# Patient Record
Sex: FEMALE | Race: ASIAN | NOT HISPANIC OR LATINO | Employment: OTHER | URBAN - METROPOLITAN AREA
[De-identification: names, ages, dates, MRNs, and addresses within clinical notes are randomized per-mention and may not be internally consistent; named-entity substitution may affect disease eponyms.]

---

## 2017-08-02 ENCOUNTER — HOSPITAL ENCOUNTER (OUTPATIENT)
Dept: MAMMOGRAPHY | Facility: CLINIC | Age: 62
Discharge: HOME | End: 2017-08-02
Attending: PHYSICIAN ASSISTANT | Admitting: PHYSICIAN ASSISTANT
Payer: COMMERCIAL

## 2017-08-02 DIAGNOSIS — Z12.31 ENCOUNTER FOR SCREENING MAMMOGRAM FOR HIGH-RISK PATIENT: ICD-10-CM

## 2017-08-02 PROCEDURE — G0202 SCR MAMMO BI INCL CAD: HCPCS

## 2018-08-29 ENCOUNTER — TRANSFERRED RECORDS (OUTPATIENT)
Dept: HEALTH INFORMATION MANAGEMENT | Facility: CLINIC | Age: 63
End: 2018-08-29

## 2018-08-29 LAB
ALT SERPL-CCNC: 14 IU/L (ref 0–32)
AST SERPL-CCNC: 17 IU/L (ref 0–40)
CHOLEST SERPL-MCNC: 179 MG/DL (ref 100–199)
CREAT SERPL-MCNC: 0.67 MG/DL (ref 0.57–1)
GFR SERPL CREATININE-BSD FRML MDRD: 95 ML/MIN/1.73M2
GLUCOSE SERPL-MCNC: 125 MG/DL (ref 65–99)
HDLC SERPL-MCNC: 48 MG/DL
LDLC SERPL CALC-MCNC: 115 MG/DL (ref 0–99)
POTASSIUM SERPL-SCNC: 4 MMOL/L (ref 3.5–5.2)
TRIGL SERPL-MCNC: 81 MG/DL (ref 0–149)
TSH SERPL-ACNC: 2.26 UIU/ML (ref 0.45–4.5)

## 2018-09-06 ENCOUNTER — HOSPITAL ENCOUNTER (OUTPATIENT)
Dept: MAMMOGRAPHY | Facility: CLINIC | Age: 63
End: 2018-09-06
Attending: PHYSICIAN ASSISTANT
Payer: COMMERCIAL

## 2018-09-06 DIAGNOSIS — Z12.31 VISIT FOR SCREENING MAMMOGRAM: ICD-10-CM

## 2018-09-06 PROCEDURE — 77063 BREAST TOMOSYNTHESIS BI: CPT

## 2018-12-12 ENCOUNTER — TRANSFERRED RECORDS (OUTPATIENT)
Dept: HEALTH INFORMATION MANAGEMENT | Facility: CLINIC | Age: 63
End: 2018-12-12

## 2019-02-19 ENCOUNTER — OFFICE VISIT (OUTPATIENT)
Dept: ENDOCRINOLOGY | Facility: CLINIC | Age: 64
End: 2019-02-19
Payer: COMMERCIAL

## 2019-02-19 VITALS
BODY MASS INDEX: 22.05 KG/M2 | SYSTOLIC BLOOD PRESSURE: 144 MMHG | HEIGHT: 60 IN | WEIGHT: 112.3 LBS | DIASTOLIC BLOOD PRESSURE: 70 MMHG | HEART RATE: 79 BPM

## 2019-02-19 DIAGNOSIS — E78.5 HYPERLIPIDEMIA LDL GOAL <100: ICD-10-CM

## 2019-02-19 DIAGNOSIS — E11.9 TYPE 2 DIABETES MELLITUS TREATED WITHOUT INSULIN (H): Primary | ICD-10-CM

## 2019-02-19 DIAGNOSIS — E78.5 HYPERLIPIDEMIA LDL GOAL <100: Primary | ICD-10-CM

## 2019-02-19 LAB — HBA1C MFR BLD: 6.5 % (ref 0–5.6)

## 2019-02-19 PROCEDURE — 83036 HEMOGLOBIN GLYCOSYLATED A1C: CPT | Performed by: INTERNAL MEDICINE

## 2019-02-19 PROCEDURE — 80048 BASIC METABOLIC PNL TOTAL CA: CPT | Performed by: INTERNAL MEDICINE

## 2019-02-19 PROCEDURE — 84443 ASSAY THYROID STIM HORMONE: CPT | Performed by: INTERNAL MEDICINE

## 2019-02-19 PROCEDURE — 36415 COLL VENOUS BLD VENIPUNCTURE: CPT | Performed by: INTERNAL MEDICINE

## 2019-02-19 PROCEDURE — 82043 UR ALBUMIN QUANTITATIVE: CPT | Performed by: INTERNAL MEDICINE

## 2019-02-19 PROCEDURE — 99214 OFFICE O/P EST MOD 30 MIN: CPT | Performed by: INTERNAL MEDICINE

## 2019-02-19 PROCEDURE — 84460 ALANINE AMINO (ALT) (SGPT): CPT | Performed by: INTERNAL MEDICINE

## 2019-02-19 RX ORDER — GLIMEPIRIDE 2 MG/1
TABLET ORAL
Refills: 0 | COMMUNITY
Start: 2019-02-16 | End: 2019-10-10

## 2019-02-19 RX ORDER — METFORMIN HCL 500 MG
TABLET, EXTENDED RELEASE 24 HR ORAL
Refills: 3 | COMMUNITY
Start: 2019-02-16 | End: 2019-10-10

## 2019-02-19 RX ORDER — PRAVASTATIN SODIUM 20 MG
20 TABLET ORAL AT BEDTIME
Qty: 90 TABLET | Refills: 3 | Status: SHIPPED | OUTPATIENT
Start: 2019-02-19 | End: 2019-12-09

## 2019-02-19 RX ORDER — AMLODIPINE BESYLATE 2.5 MG/1
TABLET ORAL
Refills: 3 | COMMUNITY
Start: 2018-12-03 | End: 2019-12-10

## 2019-02-19 RX ORDER — LOSARTAN POTASSIUM AND HYDROCHLOROTHIAZIDE 12.5; 1 MG/1; MG/1
1 TABLET ORAL DAILY
Refills: 3 | COMMUNITY
Start: 2019-02-16

## 2019-02-19 ASSESSMENT — MIFFLIN-ST. JEOR: SCORE: 983.4

## 2019-02-19 NOTE — PROGRESS NOTES
Name: Aleah Rockwell is a 64 year old woman, self-referred for evaluation of diabetes mellitus.  Previously seen by me at my former clinic ([facility]), here to establish care with [facility].    Chief Complaint   Patient presents with     Diabetes       HPI:  Recent issues:  Here for evaluation of diabetes.  Feeling well overall, no new health issues reported  Reviewed medical history from patient and Epic chart record        ~2005. Diagnosis of diabetes mellitus  ...Had seen Laura Dsouza PAC/[facility] clinic  Routine lab tests showed elevated glucose levels  Has taken metformin and glimeparide medications    Current DM medications:   metforminXR 500 mg 2-tabs QAM   Glimeparide 2 mg 1-tab BID  Using BG meter (name?)   Tests QAM   Recent FBG  mg/dl    Summer 2018. Had seen Dr. Sabiha Jacinto/[facility]  Recalls hgbA1c 7-7.5% level    Recent [facility] labs include:  Lab Results   Component Value Date     09/27/2015    POTASSIUM 4.0 08/29/2018    CHLORIDE 104 09/27/2015    CO2 28 09/27/2015    ANIONGAP 6 09/27/2015     (H) 08/29/2018    BUN 14 09/27/2015    CR 0.67 08/29/2018    GFRESTIMATED 95 08/29/2018    GFRESTBLACK 109 08/29/2018    JUNE 9.1 09/27/2015    CHOL 179 08/29/2018    TRIG 81 08/29/2018    HDL 48 08/29/2018     (H) 08/29/2018    TSH 2.260 08/29/2018     Hyperlipidemia:  Has taken Provastatin 20 mg daily... Ran out Fall 2018  Last eye exam 1/2019, no DR noted  DM Complications:  None   ...        , works as   Sees Stephanie Gallardo PAC/[facility] clinic for general medicine evaluations.  Also sees Dr. CHANDANA Zapata/[facility]    PMH/PSH:  Past Medical History:   Diagnosis Date     Anemia      Hypertension      Type 2 diabetes mellitus (H)      Past Surgical History:   Procedure Laterality Date     BONE MARROW BIOPSY, BONE SPECIMEN, NEEDLE/TROCAR  2/18/2013    Procedure: BIOPSY BONE MARROW;  BONE MARROW BIOPSY  (CONS.SED);  Surgeon:  Ethan Holt MD;  Location:  GI     C/SECTION, CLASSICAL  1982 and 1985     HYSTERECTOMY  2004    with kian S&O     ORTHOPEDIC SURGERY      ankle cyst removed       Family Hx:  No family history on file.      Social Hx:  Social History     Socioeconomic History     Marital status:      Spouse name: Not on file     Number of children: Not on file     Years of education: Not on file     Highest education level: Not on file   Social Needs     Financial resource strain: Not on file     Food insecurity - worry: Not on file     Food insecurity - inability: Not on file     Transportation needs - medical: Not on file     Transportation needs - non-medical: Not on file   Occupational History     Not on file   Tobacco Use     Smoking status: Never Smoker     Smokeless tobacco: Never Used   Substance and Sexual Activity     Alcohol use: No     Drug use: No     Sexual activity: Not on file   Other Topics Concern     Parent/sibling w/ CABG, MI or angioplasty before 65F 55M? Not Asked   Social History Narrative     Not on file          MEDICATIONS:  has a current medication list which includes the following prescription(s): amlodipine, glimepiride, losartan-hydrochlorothiazide, and metformin.    ROS:     ROS: 10 point ROS neg other than the symptoms noted above in the HPI.    GENERAL: mild fatigue, wt stable; denies fevers, chills, night sweats.   HEENT: no dysphagia, odonophagia, diplopia, neck pain  THYROID:  no apparent hyper or hypothyroid symptoms  CV: no chest pain, pressure, palpitations  LUNGS: no SOB, PARADA, cough, wheezing   ABDOMEN: no diarrhea, constipation, abdominal pain  EXTREMITIES: no rashes, ulcers, edema  NEUROLOGY: no headaches, denies changes in vision, tingling, extremitiy numbness   MSK: no muscle aches or pains, weakness  SKIN: no rashes or lesions  : no menses, denies hot flashes  PSYCH:  stable mood, no significant anxiety or depression  ENDOCRINE: no heat or cold intolerance    Physical  "Exam   VS: /70   Pulse 79   Ht 1.52 m (4' 11.84\")   Wt 50.9 kg (112 lb 4.8 oz)   BMI 22.05 kg/m    GENERAL: AXOX3, NAD, well dressed, petite size, answering questions appropriately, appears stated age.  THYROID:  normal gland, no apparent nodules or goiter  HEENT: neck non-tender, no exopthalmous, no proptosis, EOMI  CV: RRR, no rubs, gallops, no murmurs  LUNGS: CTAB, no wheezes, rales, or ronchi  ABDOMEN: soft, nontender, nondistended  EXTREMITIES: no edema, no lesions  NEUROLOGY: CN grossly intact, no tremors  MSK: grossly intact  SKIN: no rashes, no lesions    LABS:    All pertinent notes, labs, and images personally reviewed by me.     A/P:  Encounter Diagnoses   Name Primary?     Type 2 diabetes mellitus treated without insulin (H) Yes     Hyperlipidemia LDL goal <100      Comments:  Reviewed health history and diabetes issues.    Plan:  Discussed general issues with the diabetes diagnosis and management  We discussed the hgbA1c test which reflects previous overall glucose levels or control  Discussed the importance of blood glucose (BG) testing to assess glucose trends  Provided general overview of the diabetes medication options and medication treatment plan.    Recommend:  Continue current metformin and glimeparide doses at this time   Consider dose reduction of glimeparide if low glucose level trends, such as 2 mg QAM and 1 mg QPM  Goal target FBG  mg/dl  Monitor for symptom changes  Restart Provastatin 20 mg at bedtime, new Rx sent to pharmacy  Keep focus on diet, exercise, weight management.  Advise having fasting lipid panel testing and dilated eye examination, at least annually    Addressed patient questions today    Labs ordered today:   Orders Placed This Encounter   Procedures     Hemoglobin A1c     ALT     Basic metabolic panel     TSH with free T4 reflex     Albumin Random Urine Quantitative with Creat Ratio     Radiology/Consults ordered today: None    More than 50% of the time " spent with Ms. Rockwell on counseling / coordinating her care.  Total appointment time was 30 minutes.    Follow-up:  6 mo or prn    Arnel eL MD  Endocrinology  [facility]  CC: Stephanie Gallardo

## 2019-02-20 LAB
ALT SERPL W P-5'-P-CCNC: 19 U/L (ref 0–50)
ANION GAP SERPL CALCULATED.3IONS-SCNC: 4 MMOL/L (ref 3–14)
BUN SERPL-MCNC: 21 MG/DL (ref 7–30)
CALCIUM SERPL-MCNC: 9.3 MG/DL (ref 8.5–10.1)
CHLORIDE SERPL-SCNC: 107 MMOL/L (ref 94–109)
CO2 SERPL-SCNC: 26 MMOL/L (ref 20–32)
CREAT SERPL-MCNC: 0.67 MG/DL (ref 0.52–1.04)
CREAT UR-MCNC: 47 MG/DL
GFR SERPL CREATININE-BSD FRML MDRD: >90 ML/MIN/{1.73_M2}
GLUCOSE SERPL-MCNC: 157 MG/DL (ref 70–99)
MICROALBUMIN UR-MCNC: 38 MG/L
MICROALBUMIN/CREAT UR: 80.59 MG/G CR (ref 0–25)
POTASSIUM SERPL-SCNC: 3.7 MMOL/L (ref 3.4–5.3)
SODIUM SERPL-SCNC: 137 MMOL/L (ref 133–144)
TSH SERPL DL<=0.005 MIU/L-ACNC: 1.28 MU/L (ref 0.4–4)

## 2019-10-09 ENCOUNTER — TELEPHONE (OUTPATIENT)
Dept: ENDOCRINOLOGY | Facility: CLINIC | Age: 64
End: 2019-10-09

## 2019-10-09 DIAGNOSIS — E11.9 TYPE 2 DIABETES MELLITUS TREATED WITHOUT INSULIN (H): Primary | ICD-10-CM

## 2019-10-09 NOTE — TELEPHONE ENCOUNTER
Reason for Call:  Medication or medication refill:metFORMIN (GLUCOPHAGE-XR) 500 MG 24 hr tablet and glimepiride (AMARYL) 2 MG tablet    Do you use a [facility]?  Name of the pharmacy and phone number for the current request:      [id number] - [place], [place] - [address]       Name of the medication requested: metFORMIN (GLUCOPHAGE-XR) 500 MG 24 hr tablet and glimepiride (AMARYL) 2 MG tablet    Other request: Pt called requesting a refill for above medications.     Can we leave a detailed message on this number? YES    Phone number patient can be reached at: Home number on file [phone] (home)    Best Time: Anytime     Call taken on 10/9/2019 at 12:13 PM by Lorenzo Hopkins       no

## 2019-10-09 NOTE — TELEPHONE ENCOUNTER
Patient requesting refills on Metformin and Glimepiride. Medications are both listed as historical on patient's medication list.    Roger Little CMA on 10/9/2019 at 12:27 PM

## 2019-10-10 RX ORDER — GLIMEPIRIDE 2 MG/1
TABLET ORAL
Qty: 60 TABLET | Refills: 11 | Status: SHIPPED | OUTPATIENT
Start: 2019-10-10 | End: 2019-12-09

## 2019-10-10 RX ORDER — METFORMIN HCL 500 MG
TABLET, EXTENDED RELEASE 24 HR ORAL
Qty: 60 TABLET | Refills: 11 | Status: SHIPPED | OUTPATIENT
Start: 2019-10-10 | End: 2019-12-09

## 2019-10-10 NOTE — TELEPHONE ENCOUNTER
Message noted.  I don't understand why these 2 medications were listed as historical on the med list... or who may have done prior Rx.  I have now reordered both meds today, Rx's sent to her local pharmacy.    LISE Le MD, MS  Endocrinology  [facility]

## 2019-11-12 ENCOUNTER — OFFICE VISIT (OUTPATIENT)
Dept: ENDOCRINOLOGY | Facility: CLINIC | Age: 64
End: 2019-11-12
Payer: COMMERCIAL

## 2019-11-12 VITALS
HEART RATE: 74 BPM | HEIGHT: 60 IN | BODY MASS INDEX: 21.6 KG/M2 | WEIGHT: 110 LBS | SYSTOLIC BLOOD PRESSURE: 148 MMHG | DIASTOLIC BLOOD PRESSURE: 68 MMHG

## 2019-11-12 DIAGNOSIS — R80.9 TYPE 2 DIABETES MELLITUS WITH MICROALBUMINURIA, WITHOUT LONG-TERM CURRENT USE OF INSULIN (H): Primary | ICD-10-CM

## 2019-11-12 DIAGNOSIS — E11.29 TYPE 2 DIABETES MELLITUS WITH MICROALBUMINURIA, WITHOUT LONG-TERM CURRENT USE OF INSULIN (H): Primary | ICD-10-CM

## 2019-11-12 PROCEDURE — 99213 OFFICE O/P EST LOW 20 MIN: CPT | Performed by: INTERNAL MEDICINE

## 2019-11-12 ASSESSMENT — MIFFLIN-ST. JEOR: SCORE: 972.97

## 2019-11-12 NOTE — PROGRESS NOTES
Name: Aleah Rockwell      Chief Complaint   Patient presents with     Diabetes       HPI:  Recent issues:  Here for evaluation of diabetes.  Feeling well overall, no new health issues reported        ~2005. Diagnosis of diabetes mellitus  ...Had seen Laura Dsouza [facility] clinic  Routine lab tests showed elevated glucose levels  Has taken metformin and glimeparide medications    Current DM medications:   metforminXR 500 mg  2-tabs each morning   Glimeparide 2 mg  1-tab morning and evening    Using BG meter (name?)   Tests QAM   Recent FBG 74- 115 mg/dl    Summer 2018. Had seen Dr. Sabiha Jacinto/[facility]  Recalls hgbA1c 7-7.5% level    Recent [facility] labs include:  Lab Results   Component Value Date    A1C 6.5 (H) 02/19/2019     02/19/2019    POTASSIUM 3.7 02/19/2019    CHLORIDE 107 02/19/2019    CO2 26 02/19/2019    ANIONGAP 4 02/19/2019     (H) 02/19/2019    BUN 21 02/19/2019    CR 0.67 02/19/2019    GFRESTIMATED >90 02/19/2019    GFRESTBLACK >90 02/19/2019    JUNE 9.3 02/19/2019    CHOL 179 08/29/2018    TRIG 81 08/29/2018    HDL 48 08/29/2018     (H) 08/29/2018    UCRR 47 02/19/2019    MICROL 38 02/19/2019    UMALCR 80.59 (H) 02/19/2019    TSH 1.28 02/19/2019     Hyperlipidemia:  Tales Provastatin 20 mg daily  Last eye exam 1/2019, no DR noted  DM Complications:     Nephropathy:     Microalbuminuria    Takes losartan-hydrochlorothiazide, amlodipine meds        , works as   Sees Stephanie Gallardo [facility] clinic for general medicine evaluations.  Also sees Dr. CHANDANA Zapata/[facility]    PMH/PSH:  Past Medical History:   Diagnosis Date     Anemia      Hypertension      Type 2 diabetes mellitus (H)     microalbuminuria     Past Surgical History:   Procedure Laterality Date     BONE MARROW BIOPSY, BONE SPECIMEN, NEEDLE/TROCAR  2/18/2013    Procedure: BIOPSY BONE MARROW;  BONE MARROW BIOPSY  (CONS.SED);  Surgeon: Ethan Holt MD;  Location:  GI     C/SECTION, CLASSICAL  1982 and  1985     HYSTERECTOMY  2004    with kian S&O     ORTHOPEDIC SURGERY      ankle cyst removed       Family Hx:  No family history on file.      Social Hx:  Social History     Socioeconomic History     Marital status:      Spouse name: Not on file     Number of children: Not on file     Years of education: Not on file     Highest education level: Not on file   Occupational History     Not on file   Social Needs     Financial resource strain: Not on file     Food insecurity:     Worry: Not on file     Inability: Not on file     Transportation needs:     Medical: Not on file     Non-medical: Not on file   Tobacco Use     Smoking status: Never Smoker     Smokeless tobacco: Never Used   Substance and Sexual Activity     Alcohol use: No     Drug use: No     Sexual activity: Not on file   Lifestyle     Physical activity:     Days per week: Not on file     Minutes per session: Not on file     Stress: Not on file   Relationships     Social connections:     Talks on phone: Not on file     Gets together: Not on file     Attends Confucianism service: Not on file     Active member of club or organization: Not on file     Attends meetings of clubs or organizations: Not on file     Relationship status: Not on file     Intimate partner violence:     Fear of current or ex partner: Not on file     Emotionally abused: Not on file     Physically abused: Not on file     Forced sexual activity: Not on file   Other Topics Concern     Parent/sibling w/ CABG, MI or angioplasty before 65F 55M? Not Asked   Social History Narrative     Not on file          MEDICATIONS:  has a current medication list which includes the following prescription(s): alcohol swab, amlodipine, blood glucose, blood glucose calibration, blood glucose monitoring, glimepiride, losartan-hydrochlorothiazide, metformin, pravastatin, and thin.    ROS:     ROS: 10 point ROS neg other than the symptoms noted above in the HPI.    GENERAL: mild fatigue, wt stable; denies  "fevers, chills, night sweats.   HEENT: no dysphagia, odonophagia, diplopia, neck pain  THYROID:  no apparent hyper or hypothyroid symptoms  CV: no chest pain, pressure, palpitations  LUNGS: no SOB, PARADA, cough, wheezing   ABDOMEN: no diarrhea, constipation, abdominal pain  EXTREMITIES: no rashes, ulcers, edema  NEUROLOGY: no headaches, denies changes in vision, tingling, extremitiy numbness   MSK: no muscle aches or pains, weakness  SKIN: no rashes or lesions  : no menses, denies hot flashes  PSYCH:  stable mood, no significant anxiety or depression  ENDOCRINE: no heat or cold intolerance    Physical Exam   VS: BP (!) 148/68 (BP Location: Right arm, Cuff Size: Adult Regular)   Pulse 74   Ht 1.52 m (4' 11.84\")   Wt 49.9 kg (110 lb)   Breastfeeding? No   BMI 21.60 kg/m    GENERAL: AXOX3, NAD, well dressed, petite size, answering questions appropriately, appears stated age.  THYROID:  normal gland, no apparent nodules or goiter  ABDOMEN: slender size  EXTREMITIES: no edema, no lesions  NEUROLOGY: CN grossly intact, no tremors  MSK: grossly intact  SKIN: no rashes, no lesions    LABS:    All pertinent notes, labs, and images personally reviewed by me.     A/P:  Encounter Diagnosis   Name Primary?     Type 2 diabetes mellitus with microalbuminuria, without long-term current use of insulin (H) Yes        Comments:  Reviewed health history and diabetes issues.    Plan:  Discussed general issues with the diabetes diagnosis and management  We discussed the hgbA1c test which reflects previous overall glucose levels or control  Discussed the importance of blood glucose (BG) testing to assess glucose trends  Provided general overview of the diabetes medication options and medication treatment plan.    Recommend:  Continue current metformin and glimeparide medications, but lower the glimeparide as 2 mg 1-tab QAM and 1/2-tab QPM  Will update her med 90-day Rx's today   Goal target FBG  mg/dl  Needs repeat labs for " fasting lipid panel and hgbA1c, prefers to do at primary care clinic next week  Monitor for symptom changes  Continue Pravastatin evening dose  Keep focus on diet, exercise, weight management.  Advise having fasting lipid panel testing and dilated eye examination, at least annually    Addressed patient questions today    Labs ordered today:   No orders of the defined types were placed in this encounter.    Radiology/Consults ordered today: None    More than 50% of the time spent with Ms. Rockwell on counseling / coordinating her care.  Total appointment time was 20 minutes.    Follow-up:  6 mo or prn    LISE Le MD, MS  Endocrinology  [facility]    CC:  CECIL Gallardo

## 2019-11-19 ENCOUNTER — TRANSFERRED RECORDS (OUTPATIENT)
Dept: HEALTH INFORMATION MANAGEMENT | Facility: CLINIC | Age: 64
End: 2019-11-19

## 2019-11-19 LAB — TSH SERPL-ACNC: 2.6 UIU/ML (ref 0.45–4.5)

## 2019-11-26 ENCOUNTER — HOSPITAL ENCOUNTER (OUTPATIENT)
Dept: MAMMOGRAPHY | Facility: CLINIC | Age: 64
Discharge: HOME | End: 2019-11-26
Attending: PHYSICIAN ASSISTANT | Admitting: PHYSICIAN ASSISTANT
Payer: COMMERCIAL

## 2019-11-26 DIAGNOSIS — Z12.31 VISIT FOR SCREENING MAMMOGRAM: ICD-10-CM

## 2019-11-26 PROCEDURE — 77063 BREAST TOMOSYNTHESIS BI: CPT

## 2019-12-09 ENCOUNTER — HOSPITAL ENCOUNTER (OUTPATIENT)
Dept: MAMMOGRAPHY | Facility: CLINIC | Age: 64
Discharge: HOME | End: 2019-12-09
Attending: PHYSICIAN ASSISTANT | Admitting: PHYSICIAN ASSISTANT
Payer: COMMERCIAL

## 2019-12-09 DIAGNOSIS — E78.5 HYPERLIPIDEMIA LDL GOAL <100: ICD-10-CM

## 2019-12-09 DIAGNOSIS — E11.8 TYPE 2 DIABETES MELLITUS WITH COMPLICATION (H): ICD-10-CM

## 2019-12-09 DIAGNOSIS — R92.8 ABNORMAL MAMMOGRAM: ICD-10-CM

## 2019-12-09 DIAGNOSIS — E11.9 TYPE 2 DIABETES MELLITUS TREATED WITHOUT INSULIN (H): ICD-10-CM

## 2019-12-09 PROCEDURE — 76642 ULTRASOUND BREAST LIMITED: CPT | Mod: LT

## 2019-12-09 RX ORDER — GLIMEPIRIDE 2 MG/1
TABLET ORAL
Qty: 180 TABLET | Refills: 3 | Status: SHIPPED | OUTPATIENT
Start: 2019-12-09

## 2019-12-09 RX ORDER — METFORMIN HCL 500 MG
TABLET, EXTENDED RELEASE 24 HR ORAL
Qty: 180 TABLET | Refills: 3 | Status: SHIPPED | OUTPATIENT
Start: 2019-12-09 | End: 2020-11-05

## 2019-12-09 RX ORDER — PRAVASTATIN SODIUM 20 MG
20 TABLET ORAL AT BEDTIME
Qty: 90 TABLET | Refills: 3 | Status: SHIPPED | OUTPATIENT
Start: 2019-12-09 | End: 2021-01-17

## 2019-12-10 ENCOUNTER — HOSPITAL ENCOUNTER (OUTPATIENT)
Dept: MAMMOGRAPHY | Facility: CLINIC | Age: 64
End: 2019-12-10
Attending: PHYSICIAN ASSISTANT
Payer: COMMERCIAL

## 2019-12-10 ENCOUNTER — HOSPITAL ENCOUNTER (OUTPATIENT)
Dept: MAMMOGRAPHY | Facility: CLINIC | Age: 64
Discharge: HOME | End: 2019-12-10
Attending: PHYSICIAN ASSISTANT | Admitting: PHYSICIAN ASSISTANT
Payer: COMMERCIAL

## 2019-12-10 DIAGNOSIS — R92.8 ABNORMAL MAMMOGRAM: ICD-10-CM

## 2019-12-10 PROCEDURE — 40000986 MA POST PROCEDURE LEFT

## 2019-12-10 PROCEDURE — 88305 TISSUE EXAM BY PATHOLOGIST: CPT | Performed by: PHYSICIAN ASSISTANT

## 2019-12-10 PROCEDURE — 27210206 US BREAST BIOPSY CORE NEEDLE LEFT

## 2019-12-10 PROCEDURE — 88305 TISSUE EXAM BY PATHOLOGIST: CPT | Mod: 26 | Performed by: PHYSICIAN ASSISTANT

## 2019-12-10 PROCEDURE — 25000125 ZZHC RX 250: Performed by: PHYSICIAN ASSISTANT

## 2019-12-10 RX ADMIN — LIDOCAINE HYDROCHLORIDE 5 ML: 10 INJECTION, SOLUTION EPIDURAL; INFILTRATION; INTRACAUDAL; PERINEURAL at 11:27

## 2019-12-10 NOTE — DISCHARGE INSTRUCTIONS

## 2019-12-11 ENCOUNTER — TELEPHONE (OUTPATIENT)
Dept: SURGERY | Facility: CLINIC | Age: 64
End: 2019-12-11

## 2019-12-11 LAB — COPATH REPORT: NORMAL

## 2019-12-11 NOTE — TELEPHONE ENCOUNTER
Call placed to patient.  verified.    Patient notified pathology results from left breast biopsy performed on 12/10/2019 revealed: benign breast tissue with focal microcalcifications.    Per radiologist, Dr. Tj Dominguez, results are concordant with imaging findings.    Recommendation: Diagnostic left breast mammogram and left breast ultrasound in six months.      All patient's questions answered appropriately and thoroughly. Patient will call our office in the interim with additional questions/concerns.     Both parties in agreement of above plan.    Micaela WHITMOREN, RN, OCN  Oncology Care Coordinator  [facility]  [facility]  Phone: [phone]

## 2020-11-04 DIAGNOSIS — E11.9 TYPE 2 DIABETES MELLITUS TREATED WITHOUT INSULIN (H): ICD-10-CM

## 2020-11-05 RX ORDER — METFORMIN HCL 500 MG
TABLET, EXTENDED RELEASE 24 HR ORAL
Qty: 180 TABLET | Refills: 0 | Status: SHIPPED | OUTPATIENT
Start: 2020-11-05

## 2021-01-06 ENCOUNTER — HOSPITAL ENCOUNTER (OUTPATIENT)
Dept: MAMMOGRAPHY | Facility: CLINIC | Age: 66
End: 2021-01-06
Attending: PHYSICIAN ASSISTANT
Payer: COMMERCIAL

## 2021-01-06 PROCEDURE — 76642 ULTRASOUND BREAST LIMITED: CPT | Mod: LT

## 2021-01-06 PROCEDURE — G0279 TOMOSYNTHESIS, MAMMO: HCPCS

## 2021-11-12 ENCOUNTER — TRANSFERRED RECORDS (OUTPATIENT)
Dept: HEALTH INFORMATION MANAGEMENT | Facility: CLINIC | Age: 66
End: 2021-11-12
Payer: COMMERCIAL

## 2021-11-12 DIAGNOSIS — I51.7 ATRIAL DILATION: ICD-10-CM

## 2021-11-12 DIAGNOSIS — I07.1 TRICUSPID REGURGITATION: Primary | ICD-10-CM

## 2021-11-20 DIAGNOSIS — E11.9 TYPE 2 DIABETES MELLITUS TREATED WITHOUT INSULIN (H): ICD-10-CM

## 2021-11-20 DIAGNOSIS — E11.8 TYPE 2 DIABETES MELLITUS WITH COMPLICATION (H): ICD-10-CM

## 2021-11-22 RX ORDER — BLOOD SUGAR DIAGNOSTIC
STRIP MISCELLANEOUS
Refills: 0 | OUTPATIENT
Start: 2021-11-22

## 2021-11-23 NOTE — TELEPHONE ENCOUNTER
Patient has T2DM and has not returned since 11/2020, no future endocrinology appointments scheduled.  She should have her PCP renew her diabetes meds and assist with diabetes management.    LISE Le MD, MS  Endocrinology  [facility]

## 2021-11-29 DIAGNOSIS — E11.8 TYPE 2 DIABETES MELLITUS WITH COMPLICATION (H): ICD-10-CM

## 2021-11-29 DIAGNOSIS — E11.9 TYPE 2 DIABETES MELLITUS TREATED WITHOUT INSULIN (H): ICD-10-CM

## 2021-12-03 RX ORDER — BLOOD SUGAR DIAGNOSTIC
STRIP MISCELLANEOUS
Refills: 0 | OUTPATIENT
Start: 2021-12-03

## 2021-12-31 ENCOUNTER — HOSPITAL ENCOUNTER (OUTPATIENT)
Dept: CARDIOLOGY | Facility: CLINIC | Age: 66
Discharge: HOME | End: 2021-12-31
Attending: PHYSICIAN ASSISTANT | Admitting: PHYSICIAN ASSISTANT
Payer: COMMERCIAL

## 2021-12-31 DIAGNOSIS — I51.7 ATRIAL DILATION: ICD-10-CM

## 2021-12-31 DIAGNOSIS — I07.1 TRICUSPID REGURGITATION: ICD-10-CM

## 2021-12-31 LAB — LVEF ECHO: NORMAL

## 2021-12-31 PROCEDURE — 93306 TTE W/DOPPLER COMPLETE: CPT | Mod: 26 | Performed by: INTERNAL MEDICINE

## 2021-12-31 PROCEDURE — 93306 TTE W/DOPPLER COMPLETE: CPT

## 2022-01-05 ENCOUNTER — MEDICAL CORRESPONDENCE (OUTPATIENT)
Dept: HEALTH INFORMATION MANAGEMENT | Facility: CLINIC | Age: 67
End: 2022-01-05
Payer: COMMERCIAL

## 2022-01-06 DIAGNOSIS — R01.1 HEART MURMUR: Primary | ICD-10-CM

## 2022-01-25 ENCOUNTER — HOSPITAL ENCOUNTER (OUTPATIENT)
Dept: MAMMOGRAPHY | Facility: CLINIC | Age: 67
Discharge: HOME | End: 2022-01-25
Attending: PHYSICIAN ASSISTANT | Admitting: PHYSICIAN ASSISTANT
Payer: COMMERCIAL

## 2022-01-25 DIAGNOSIS — Z12.31 OTHER SCREENING MAMMOGRAM: ICD-10-CM

## 2022-01-25 PROCEDURE — 77067 SCR MAMMO BI INCL CAD: CPT

## 2022-02-08 ENCOUNTER — OFFICE VISIT (OUTPATIENT)
Dept: CARDIOLOGY | Facility: CLINIC | Age: 67
End: 2022-02-08
Attending: PHYSICIAN ASSISTANT
Payer: COMMERCIAL

## 2022-02-08 VITALS
SYSTOLIC BLOOD PRESSURE: 120 MMHG | HEART RATE: 70 BPM | DIASTOLIC BLOOD PRESSURE: 67 MMHG | WEIGHT: 107 LBS | BODY MASS INDEX: 21.01 KG/M2 | HEIGHT: 60 IN

## 2022-02-08 DIAGNOSIS — R01.1 HEART MURMUR: ICD-10-CM

## 2022-02-08 DIAGNOSIS — I10 ESSENTIAL HYPERTENSION: Primary | ICD-10-CM

## 2022-02-08 DIAGNOSIS — I34.0 NONRHEUMATIC MITRAL VALVE REGURGITATION: ICD-10-CM

## 2022-02-08 DIAGNOSIS — E11.9 TYPE 2 DIABETES MELLITUS WITHOUT COMPLICATION, WITHOUT LONG-TERM CURRENT USE OF INSULIN (H): ICD-10-CM

## 2022-02-08 DIAGNOSIS — E78.2 MIXED HYPERLIPIDEMIA: ICD-10-CM

## 2022-02-08 PROCEDURE — 93000 ELECTROCARDIOGRAM COMPLETE: CPT | Performed by: INTERNAL MEDICINE

## 2022-02-08 PROCEDURE — 99204 OFFICE O/P NEW MOD 45 MIN: CPT | Mod: 25 | Performed by: INTERNAL MEDICINE

## 2022-02-08 ASSESSMENT — MIFFLIN-ST. JEOR: SCORE: 944.31

## 2022-02-08 NOTE — LETTER
2/8/2022    Stephanie Gallardo PA-C  [employer] [address]  [facility] [address]    RE: Aleah Rockwell       Dear Colleague,     I had the pleasure of seeing lAeah Rockwell in the [facility].  CARDIOLOGY CLINIC CONSULTATION      REASON FOR CONSULT:   Establish care, follow-up echo results    PRIMARY CARE PHYSICIAN:  Stephanie Gallardo        History of Present Illness   Aleah Rockwell is an extremely pleasant 67 year old female here as a new patient for discussion of her recent echocardiogram results.  She has a past medical history significant for non-insulin-dependent DM2, hypertension, and dyslipidemia.  She is a never smoker.  She has no history of premature CAD, valvular heart disease, or congestive heart failure.    She was referred today by her primary care provider, Stephanie Gallardo PA-C, to discuss her recent echocardiogram results.  The echocardiogram was done for evaluation of a murmur noted incidentally on exam.  Symptomatically, Aleah has been feeling great, with no chest pain, shortness of breath, lower extremity swelling, palpitations, or lightheadedness/syncope.  She has been exercising less due to the COVID-19 pandemic, but still goes on occasional 3-4 mile walks with her  with no issues.  Her blood pressure today was significantly elevated in the 170s systolic, but she tells me that she is extremely anxious for this appointment.  She checked her blood pressure 3 times this morning, and it was consistently in the 120s over 60s.    She had a TTE that was done on 12/31/2021, and this showed hyperdynamic LV function with LVEF estimated greater than 70%, normal RV size and function, and mild mitral regurgitation.  I reviewed the images as well, and agree with report.  There is also some mild thickening of the anterior mitral leaflet.  She mainly checks her labs through her PCP office, but her most recent set of lipids that I can see was from  8/29/2018, and showed a total cholesterol of 179, HDL of 48, LDL of 115, and triglycerides of 81.  Her most recent chemistry panel was from 2/19/2019, and showed normal electrolytes, normal renal function, and a hemoglobin A1c of 6.5%.      Assessment & Plan     1. Mild (1+) asymptomatic mitral regurgitation  2. Non-insulin-dependent DM2  3. Hypertension, with whitecoat component  4. Dyslipidemia      It was a pleasure to meet with Aleah in clinic today.  We discussed the results of her echocardiogram.  I think it was very reasonable to have her see cardiology to discuss these results to make sure that there was nothing concerning, but thankfully her echo is actually quite excellent.  I told her that regarding her mitral regurgitation, she will probably never have any clinically relevant consequences from this, but I would just plan to check another echo in 5 years, or sooner if she develops any symptoms concerning for this such as shortness of breath, lower extremity swelling, etc.  Otherwise, as far as the hyperdynamic LV function, I told her not to worry about this, and part of this is just due to her small LV cavity size likely related to her small body stature as well.  I am not concerned about her left or right ventricular function at this time.    Regarding her hypertension, she clearly has a significant whitecoat component, but she is very diligent about following her blood pressures at home and it is very well controlled there, so I would not make any adjustments to her antihypertensive regimen.  I am glad to see that she is on a statin, and she follows with her PCP for management of her dyslipidemia, so I will defer this to them.      -Reassurance given for echo results  -Plan for repeat TTE to follow mild mitral regurgitation in 5 years, or sooner if concerning symptoms develop  -Continue amlodipine 5 mg daily and losartan-HCTZ 100-12.5 mg daily  -Continue statin; lipid management per  PCP      Follow-up: No routine cardiology follow-up required at this time.  However, we would be happy to see Aleah back at anytime with future questions or concerns.          Russ Aguila MD  Interventional Cardiology  February 8, 2022        Medications   Current Outpatient Medications   Medication     alcohol swab prep pads     AMLODIPINE BESYLATE PO     blood glucose (NO BRAND SPECIFIED) test strip     blood glucose calibration (NO BRAND SPECIFIED) solution     blood glucose monitoring (NO BRAND SPECIFIED) meter device kit     glimepiride (AMARYL) 2 MG tablet     losartan-hydrochlorothiazide (HYZAAR) 100-12.5 MG tablet     metFORMIN (GLUCOPHAGE-XR) 500 MG 24 hr tablet     pravastatin (PRAVACHOL) 20 MG tablet     thin (NO BRAND SPECIFIED) lancets     No current facility-administered medications for this visit.     Allergies   Allergies   Allergen Reactions     Seasonal Allergies          Physical Exam       BP: 120/67 Pulse: 70            Vital Signs with Ranges  Pulse:  [70] 70  BP: (120-173)/(67-76) 120/67  107 lbs 0 oz    Constitutional: Well-appearing, no acute distress  Respiratory: Normal respiratory effort, CTAB  Cardiovascular: RRR, 2/6 holosystolic murmur at the apex.  JVP < 7 cm H2O.  There is no LE edema.  Normal carotid upstrokes, no carotid bruits.        Thank you for allowing me to participate in the care of your patient.      Sincerely,     Russ Aguila MD     [facility]  cc:   Stephanie Gallardo PA-C  ELOISE Wengo PA  [address]  [place] [address]

## 2022-02-08 NOTE — PROGRESS NOTES
CARDIOLOGY CLINIC CONSULTATION      REASON FOR CONSULT:   Establish care, follow-up echo results    PRIMARY CARE PHYSICIAN:  Stephanie Gallardo        History of Present Illness   Aleah Rockwell is an extremely pleasant 67 year old female here as a new patient for discussion of her recent echocardiogram results.  She has a past medical history significant for non-insulin-dependent DM2, hypertension, and dyslipidemia.  She is a never smoker.  She has no history of premature CAD, valvular heart disease, or congestive heart failure.    She was referred today by her primary care provider, Stephanie Gallardo PA-C, to discuss her recent echocardiogram results.  The echocardiogram was done for evaluation of a murmur noted incidentally on exam.  Symptomatically, Aleah has been feeling great, with no chest pain, shortness of breath, lower extremity swelling, palpitations, or lightheadedness/syncope.  She has been exercising less due to the COVID-19 pandemic, but still goes on occasional 3-4 mile walks with her  with no issues.  Her blood pressure today was significantly elevated in the 170s systolic, but she tells me that she is extremely anxious for this appointment.  She checked her blood pressure 3 times this morning, and it was consistently in the 120s over 60s.    She had a TTE that was done on 12/31/2021, and this showed hyperdynamic LV function with LVEF estimated greater than 70%, normal RV size and function, and mild mitral regurgitation.  I reviewed the images as well, and agree with report.  There is also some mild thickening of the anterior mitral leaflet.  She mainly checks her labs through her PCP office, but her most recent set of lipids that I can see was from 8/29/2018, and showed a total cholesterol of 179, HDL of 48, LDL of 115, and triglycerides of 81.  Her most recent chemistry panel was from 2/19/2019, and showed normal electrolytes, normal renal function, and a hemoglobin A1c of  6.5%.      Assessment & Plan     1. Mild (1+) asymptomatic mitral regurgitation  2. Non-insulin-dependent DM2  3. Hypertension, with whitecoat component  4. Dyslipidemia      It was a pleasure to meet with Aleah in clinic today.  We discussed the results of her echocardiogram.  I think it was very reasonable to have her see cardiology to discuss these results to make sure that there was nothing concerning, but thankfully her echo is actually quite excellent.  I told her that regarding her mitral regurgitation, she will probably never have any clinically relevant consequences from this, but I would just plan to check another echo in 5 years, or sooner if she develops any symptoms concerning for this such as shortness of breath, lower extremity swelling, etc.  Otherwise, as far as the hyperdynamic LV function, I told her not to worry about this, and part of this is just due to her small LV cavity size likely related to her small body stature as well.  I am not concerned about her left or right ventricular function at this time.    Regarding her hypertension, she clearly has a significant whitecoat component, but she is very diligent about following her blood pressures at home and it is very well controlled there, so I would not make any adjustments to her antihypertensive regimen.  I am glad to see that she is on a statin, and she follows with her PCP for management of her dyslipidemia, so I will defer this to them.      -Reassurance given for echo results  -Plan for repeat TTE to follow mild mitral regurgitation in 5 years, or sooner if concerning symptoms develop  -Continue amlodipine 5 mg daily and losartan-HCTZ 100-12.5 mg daily  -Continue statin; lipid management per PCP      Follow-up: No routine cardiology follow-up required at this time.  However, we would be happy to see Aleah back at anytime with future questions or concerns.          Russ Aguila MD  Interventional Cardiology  February 8,  2022        Medications   Current Outpatient Medications   Medication     alcohol swab prep pads     AMLODIPINE BESYLATE PO     blood glucose (NO BRAND SPECIFIED) test strip     blood glucose calibration (NO BRAND SPECIFIED) solution     blood glucose monitoring (NO BRAND SPECIFIED) meter device kit     glimepiride (AMARYL) 2 MG tablet     losartan-hydrochlorothiazide (HYZAAR) 100-12.5 MG tablet     metFORMIN (GLUCOPHAGE-XR) 500 MG 24 hr tablet     pravastatin (PRAVACHOL) 20 MG tablet     thin (NO BRAND SPECIFIED) lancets     No current facility-administered medications for this visit.     Allergies   Allergies   Allergen Reactions     Seasonal Allergies          Physical Exam       BP: 120/67 Pulse: 70            Vital Signs with Ranges  Pulse:  [70] 70  BP: (120-173)/(67-76) 120/67  107 lbs 0 oz    Constitutional: Well-appearing, no acute distress  Respiratory: Normal respiratory effort, CTAB  Cardiovascular: RRR, 2/6 holosystolic murmur at the apex.  JVP < 7 cm H2O.  There is no LE edema.  Normal carotid upstrokes, no carotid bruits.

## 2023-02-02 ENCOUNTER — HOSPITAL ENCOUNTER (OUTPATIENT)
Dept: MAMMOGRAPHY | Facility: CLINIC | Age: 68
Discharge: HOME | End: 2023-02-02
Attending: PHYSICIAN ASSISTANT | Admitting: PHYSICIAN ASSISTANT
Payer: COMMERCIAL

## 2023-02-02 DIAGNOSIS — Z12.31 VISIT FOR SCREENING MAMMOGRAM: ICD-10-CM

## 2023-02-02 PROCEDURE — 77067 SCR MAMMO BI INCL CAD: CPT

## 2024-02-14 ENCOUNTER — HOSPITAL ENCOUNTER (OUTPATIENT)
Dept: MAMMOGRAPHY | Facility: CLINIC | Age: 69
Discharge: HOME | End: 2024-02-14
Attending: PHYSICIAN ASSISTANT | Admitting: PHYSICIAN ASSISTANT
Payer: COMMERCIAL

## 2024-02-14 DIAGNOSIS — Z12.31 VISIT FOR SCREENING MAMMOGRAM: ICD-10-CM

## 2024-02-14 PROCEDURE — 77063 BREAST TOMOSYNTHESIS BI: CPT

## 2024-06-26 ENCOUNTER — TRANSCRIBE ORDERS (OUTPATIENT)
Dept: OTHER | Age: 69
End: 2024-06-26

## 2024-06-26 DIAGNOSIS — M81.0 OSTEOPOROSIS: Primary | ICD-10-CM

## 2024-07-10 ENCOUNTER — THERAPY VISIT (OUTPATIENT)
Dept: PHYSICAL THERAPY | Facility: CLINIC | Age: 69
End: 2024-07-10
Attending: PHYSICIAN ASSISTANT
Payer: COMMERCIAL

## 2024-07-10 DIAGNOSIS — R26.89 BALANCE PROBLEMS: Primary | ICD-10-CM

## 2024-07-10 DIAGNOSIS — M81.0 OSTEOPOROSIS: ICD-10-CM

## 2024-07-10 PROCEDURE — 97110 THERAPEUTIC EXERCISES: CPT | Mod: GP | Performed by: PHYSICAL THERAPIST

## 2024-07-10 PROCEDURE — 97161 PT EVAL LOW COMPLEX 20 MIN: CPT | Mod: GP | Performed by: PHYSICAL THERAPIST

## 2024-07-10 NOTE — PROGRESS NOTES
"PHYSICAL THERAPY EVALUATION  Type of Visit: Evaluation       Fall Risk Screen:  Have you fallen 2 or more times in the past year?: No  Have you fallen and had an injury in the past year?: No  Is patient a fall risk?: No    Subjective       Presenting condition or subjective complaint: low bone densityThe pt presents with osteoporosis. Per MD orders, PT to assist with \"Strength and balance to treat osteoporosis and prevent falls\". Overall balance feels pretty good. No reports of pain. Denies N/T  Currently patient goes to the gym 3-4x/week. Enjoys walking. (About 3 miles)  Date of onset: 06/26/24    Relevant medical history: Diabetes; High blood pressure; Osteoporosis   Past Medical History:   Diagnosis Date    Anemia     Hypertension     Type 2 diabetes mellitus (H)     microalbuminuria       Dates & types of surgery:      Prior diagnostic imaging/testing results: Other     Prior therapy history for the same diagnosis, illness or injury:        Prior Level of Function  Transfers: Independent  Ambulation: Independent      Living Environment  Social support:     Type of home: House   Stairs to enter the home:         Ramp:     Stairs inside the home:       stairs to basement  Help at home:    Equipment owned:       Employment:    retired  Hobbies/Interests:      Patient goals for therapy: prevent fall and learn to have good balance    Pain assessment: Pain denied     Objective   Cognitive Status Examination  Orientation: Oriented to person, place and time   Level of Consciousness: Alert      OBSERVATION:   POSTURE: WNL   STRENGTH: grossly 4/5    BED MOBILITY: Independent    TRANSFERS: Independent    GAIT:   Gait Description: Slightly WBOS    BALANCE:     SPECIAL TESTS  Functional Gait Assessment (FGA) TOTAL SCORE: (MAXIMUM SCORE 30): 25  (<22/30 indicates fall risk)   10 Meter Walk Test (Comfortable)  1.05 m/s           30 sec sit to stand  17 reps     Dynamic Gait Index (DGI)     (<19/24 indicates fall risk)   Timed " Up and Go (TUG) - sec    Single Leg Stance Right (sec) 6   Single Leg Stance Left (sec) 2   Modified CTSIB Conditions (sec) Cond 1: 30  Cond 2: 30  Cond 4: 30  Cond 5 : 30   Romberg  (sec)    Sharpened Romberg (sec)            SENSATION: LE Sensation WNL          Assessment & Plan   CLINICAL IMPRESSIONS  Medical Diagnosis: Osteoporosis    Treatment Diagnosis: IMpaired high level balance and generalized weakness   Impression/Assessment: Patient is a 69 year old female with strength and balance complaints.  The following significant findings have been identified: Decreased strength, Impaired gait, and Instability. These impairments interfere with their ability to perform recreational activities and community mobility as compared to previous level of function.     Clinical Decision Making (Complexity):  Clinical Presentation: Stable/Uncomplicated  Clinical Presentation Rationale: based on medical and personal factors listed in PT evaluation  Clinical Decision Making (Complexity): Low complexity    PLAN OF CARE  Treatment Interventions:  Interventions: Gait Training, Neuromuscular Re-education, Therapeutic Activity, Therapeutic Exercise    Long Term Goals     PT Goal 1  Goal Identifier: HEP  Goal Description: The pt will be independent with an HEP in order to improve self management of symptoms  Target Date: 09/07/24  PT Goal 2  Goal Identifier: SLS  Goal Description: The pt will improve SLS time on R foot to 10 sec and L foot to 6 seconds, improving ability to complete obstacle negotiation and dressing tasks  Goal Progress: baseline: 6 sec R, 2-3 sec L  Target Date: 09/07/24      Frequency of Treatment: 1 visit every 2-3 weeks  Duration of Treatment: 60 days      Education Assessment:   Learner/Method: Patient  Education Comments: PT role, POC    Risks and benefits of evaluation/treatment have been explained.   Patient/Family/caregiver agrees with Plan of Care.     Evaluation Time:     PT Mc, Low Complexity Minutes  (93729): 15       Signing Clinician: Daisy Peres, PT        [facility]                                                                                   OUTPATIENT PHYSICAL THERAPY      PLAN OF TREATMENT FOR OUTPATIENT REHABILITATION   Patient's Last Name, First Name, Aleah Saez YOB: 1955   Provider's Name   [facility]   Medical Record No.  [id number]     Onset Date: 06/26/24  Start of Care Date: 07/10/24     Medical Diagnosis:  Osteoporosis      PT Treatment Diagnosis:  IMpaired high level balance and generalized weakness Plan of Treatment  Frequency/Duration: 1 visit every 2-3 weeks/ 60 days    Certification date from 07/10/24 to 09/07/24         See note for plan of treatment details and functional goals     Daisy Peres, PT                               I CERTIFY THE NEED FOR THESE SERVICES FURNISHED UNDER        THIS PLAN OF TREATMENT AND WHILE UNDER MY CARE                Referring Provider:  Stephanie Gallardo    Initial Assessment  See Epic Evaluation- Start of Care Date: 07/10/24

## 2024-08-27 ENCOUNTER — THERAPY VISIT (OUTPATIENT)
Dept: PHYSICAL THERAPY | Facility: CLINIC | Age: 69
End: 2024-08-27
Payer: COMMERCIAL

## 2024-08-27 DIAGNOSIS — R26.89 BALANCE PROBLEMS: ICD-10-CM

## 2024-08-27 DIAGNOSIS — M81.0 OSTEOPOROSIS, UNSPECIFIED OSTEOPOROSIS TYPE, UNSPECIFIED PATHOLOGICAL FRACTURE PRESENCE: Primary | ICD-10-CM

## 2024-08-27 PROCEDURE — 97112 NEUROMUSCULAR REEDUCATION: CPT | Mod: GP | Performed by: PHYSICAL THERAPIST

## 2024-08-27 NOTE — PROGRESS NOTES
08/27/24 0500   Appointment Info   Signing clinician's name / credentials HILTON Peres DPT   Visits Used 2   Medical Diagnosis Osteoporosis   PT Tx Diagnosis IMpaired high level balance and generalized weakness   Progress Note/Certification   Start of Care Date 07/10/24   Onset of illness/injury or Date of Surgery 06/26/24   Therapy Frequency 1 visit every 2-3 weeks   Predicted Duration 60 days   Certification date from 07/10/24   Certification date to 09/07/24   PT Goal 1   Goal Identifier HEP   Goal Description The pt will be independent with an HEP in order to improve self management of symptoms   Target Date 09/07/24   Date Met 08/27/24   PT Goal 2   Goal Identifier SLS   Goal Description The pt will improve SLS time on R foot to 10 sec and L foot to 6 seconds, improving ability to complete obstacle negotiation and dressing tasks   Goal Progress baseline: 6 sec R, 2-3 sec L, improvd to 10 sec on L, 5 sec on R, nearly met   Target Date 09/07/24   Subjective Report   Subjective Report Doing well   Therapeutic Procedure/Exercise   Therapeutic Procedures: strength, endurance, ROM, flexibility minutes (36010) 7   Ther Proc 1 - Details Leg press 35lbs x 10 reps, pt jeniffer well. Discussed increasing weight so fatigued after 8-12 reps, 2-3 sets. Also showed picture of row strength machine, education on importance of postural muscle strnegthening in setting of osteoporosis. The pt feels comofrtable with gym based strengthening program at this time.   Neuromuscular Re-education   Neuromuscular re-ed of mvmt, balance, coord, kinesthetic sense, posture, proprioception minutes (17725) 8   Neuro Re-ed 1 - Details Walking balance warm up of high knees, retroambulation and sidestepping 40' each. tandem gati x 40'. Practiced SLS- pt demonstrating improvement, nearly meeting goal   Plan   Plan for next session d/c   Total Session Time   Timed Code Treatment Minutes 15   Total Treatment Time (sum of timed and untimed services) 15          DISCHARGE  Reason for Discharge: nearly met all goals and is independent with HEP        Discharge Plan: Patient to continue home program.    Referring Provider:  Stephanie Gallardo

## 2025-02-18 ENCOUNTER — HOSPITAL ENCOUNTER (OUTPATIENT)
Dept: MAMMOGRAPHY | Facility: CLINIC | Age: 70
Discharge: HOME | End: 2025-02-18
Attending: PHYSICIAN ASSISTANT
Payer: COMMERCIAL

## 2025-02-18 DIAGNOSIS — Z12.31 VISIT FOR SCREENING MAMMOGRAM: ICD-10-CM

## 2025-02-18 PROCEDURE — 77067 SCR MAMMO BI INCL CAD: CPT
